# Patient Record
Sex: MALE | Employment: FULL TIME | URBAN - METROPOLITAN AREA
[De-identification: names, ages, dates, MRNs, and addresses within clinical notes are randomized per-mention and may not be internally consistent; named-entity substitution may affect disease eponyms.]

---

## 2018-06-05 ENCOUNTER — OFFICE VISIT (OUTPATIENT)
Dept: MEDICAL GROUP | Facility: PHYSICIAN GROUP | Age: 68
End: 2018-06-05
Payer: MEDICARE

## 2018-06-05 VITALS
RESPIRATION RATE: 16 BRPM | SYSTOLIC BLOOD PRESSURE: 125 MMHG | BODY MASS INDEX: 21.16 KG/M2 | DIASTOLIC BLOOD PRESSURE: 80 MMHG | WEIGHT: 156.2 LBS | TEMPERATURE: 98.8 F | HEART RATE: 85 BPM | OXYGEN SATURATION: 94 % | HEIGHT: 72 IN

## 2018-06-05 DIAGNOSIS — F10.10 ALCOHOL ABUSE: ICD-10-CM

## 2018-06-05 DIAGNOSIS — Z23 NEED FOR VACCINATION: ICD-10-CM

## 2018-06-05 DIAGNOSIS — E05.00 GRAVES' DISEASE: ICD-10-CM

## 2018-06-05 PROCEDURE — 99213 OFFICE O/P EST LOW 20 MIN: CPT | Mod: 25 | Performed by: NURSE PRACTITIONER

## 2018-06-05 PROCEDURE — 90472 IMMUNIZATION ADMIN EACH ADD: CPT | Performed by: NURSE PRACTITIONER

## 2018-06-05 PROCEDURE — 90715 TDAP VACCINE 7 YRS/> IM: CPT | Performed by: NURSE PRACTITIONER

## 2018-06-05 PROCEDURE — G0009 ADMIN PNEUMOCOCCAL VACCINE: HCPCS | Performed by: NURSE PRACTITIONER

## 2018-06-05 PROCEDURE — 90670 PCV13 VACCINE IM: CPT | Performed by: NURSE PRACTITIONER

## 2018-06-05 ASSESSMENT — ENCOUNTER SYMPTOMS
DIAPHORESIS: 0
DIZZINESS: 0
SHORTNESS OF BREATH: 0
FEVER: 0
BLURRED VISION: 0
CHILLS: 0
HEADACHES: 0

## 2018-06-05 ASSESSMENT — PATIENT HEALTH QUESTIONNAIRE - PHQ9: CLINICAL INTERPRETATION OF PHQ2 SCORE: 0

## 2018-06-05 NOTE — PROGRESS NOTES
Jose Tsang is a 67 y.o. male here to establish care. His previous PCP was Dr. Martinez. He does live in [place] several months out of year and he sees PCP routinely in [place]. He presents with the following concerns:    HPI:      Graves' disease  Chronic in nature. His symptoms are controlled with Methimazole 5 mg daily. He is not currently followed by endocrinology. Reports his last thyroid labs were checked 4 months ago by his provider located in [place].      Current medicines (including changes today)  Current Outpatient Prescriptions   Medication Sig Dispense Refill   • methimazole (TAPAZOLE) 5 MG TABS Take 1 Tab by mouth every day. 90 Tab 3     No current facility-administered medications for this visit.      He  has a past medical history of Grave's disease and Restless legs syndrome (RLS).  He  has a past surgical history that includes lumbar fusion anterior.  Social History   Substance Use Topics   • Smoking status: Current Every Day Smoker     Packs/day: 0.25     Years: 40.00     Types: Cigars, Cigarettes   • Smokeless tobacco: Never Used      Comment: 3 cigarettes / day   • Alcohol use 14.0 oz/week     28 Cans of beer per week      Comment: 3-4 beers/day     Social History     Social History Narrative   • No narrative on file     Family History   Problem Relation Age of Onset   • Heart Disease Father      MI-age 83   • Cancer Father      Mets. Prostate?   • Heart Disease Brother      arrythmia    • No Known Problems Mother    • Cancer Paternal Grandfather      Unsure of type?   • No Known Problems Son    • No Known Problems Daughter    • No Known Problems Daughter      Family Status   Relation Status   • Father    • Brother          Review of Systems   Constitutional: Negative for chills, diaphoresis, fever and malaise/fatigue.   Eyes: Negative for blurred vision.   Respiratory: Negative for shortness of breath.    Cardiovascular: Negative for chest pain.   Neurological:  "Negative for dizziness and headaches.        Objective:     Blood pressure 125/80, pulse 85, temperature 37.1 °C (98.8 °F), resp. rate 16, height 1.816 m (5' 11.5\"), weight 70.9 kg (156 lb 3.2 oz), SpO2 94 %. Body mass index is 21.48 kg/m².    Physical Exam:  Constitutional: Oriented to person, place, and time and well-developed, well-nourished, and in no distress.   HENT:   Head: Normocephalic and atraumatic.   Right Ear: Tympanic membrane and external ear normal.   Left Ear: Tympanic membrane and external ear normal.   Mouth/Throat: Oropharynx is clear and moist and mucous membranes are normal. No oropharyngeal exudate or posterior oropharyngeal erythema.   Eyes: Conjunctivae and EOM are normal. Pupils are equal, round, and reactive to light. Exophthalmos of R eye.  Neck: Normal range of motion. Neck supple. No thyromegaly present.   Cardiovascular: Normal rate, regular rhythm, normal heart sounds. Radial and pedal pulses intact. Exam reveals no friction rub. No murmur heard.  Pulmonary/Chest: Effort normal and breath sounds normal. No respiratory distress or use of accessory muscles. No wheezes, rhonchi, or rales.   Musculoskeletal: Full range of motion. No deformity or swelling of joints.   Neurological: Alert and oriented to person, place, and time. Gait normal.   Skin: Skin is warm and dry. No cyanosis. No edema.  Psychiatric: Mood, memory, affect and judgment normal.     Assessment and Plan:   The following treatment plan was discussed    1. Graves' disease  Stable, controlled with medication. He was referred to endocrinologist for further management.  - REFERRAL TO ENDOCRINOLOGY    2. Alcohol abuse  He drinks 3-5 beers daily. Strongly encourage that he cuts back on ETOH use. Recommend routine labs to assess liver function; patient declines any laboratory studies today.    3. Need for vaccination  I have placed the below orders and discussed them with an approved delegating provider.  The MA is performing the " below orders under the direction of Dr Hendrix.  - Tdap =>6yo IM  - Prevnar 13 PCV-13    Followup: Return if symptoms worsen or fail to improve.

## 2018-06-05 NOTE — ASSESSMENT & PLAN NOTE
Chronic in nature. His symptoms are controlled with Methimazole 5 mg daily. He is not currently followed by endocrinology. Reports his last thyroid labs were checked 4 months ago by his provider located in [facility].

## 2021-03-03 DIAGNOSIS — Z23 NEED FOR VACCINATION: ICD-10-CM

## 2023-11-17 ENCOUNTER — TELEPHONE (OUTPATIENT)
Dept: SCHEDULING | Facility: IMAGING CENTER | Age: 73
End: 2023-11-17

## 2023-11-21 ENCOUNTER — APPOINTMENT (OUTPATIENT)
Dept: MEDICAL GROUP | Facility: MEDICAL CENTER | Age: 73
End: 2023-11-21
Payer: OTHER GOVERNMENT